# Patient Record
Sex: MALE | URBAN - METROPOLITAN AREA
[De-identification: names, ages, dates, MRNs, and addresses within clinical notes are randomized per-mention and may not be internally consistent; named-entity substitution may affect disease eponyms.]

---

## 2021-08-20 ENCOUNTER — WEB ENCOUNTER (OUTPATIENT)
Dept: URBAN - METROPOLITAN AREA CLINIC 80 | Facility: CLINIC | Age: 56
End: 2021-08-20

## 2023-06-22 ENCOUNTER — OFFICE VISIT (OUTPATIENT)
Dept: URBAN - METROPOLITAN AREA CLINIC 80 | Facility: CLINIC | Age: 58
End: 2023-06-22

## 2023-08-15 ENCOUNTER — OFFICE VISIT (OUTPATIENT)
Dept: URBAN - METROPOLITAN AREA CLINIC 80 | Facility: CLINIC | Age: 58
End: 2023-08-15

## 2023-10-16 ENCOUNTER — OFFICE VISIT (OUTPATIENT)
Dept: URBAN - METROPOLITAN AREA CLINIC 80 | Facility: CLINIC | Age: 58
End: 2023-10-16
Payer: COMMERCIAL

## 2023-10-16 ENCOUNTER — DASHBOARD ENCOUNTERS (OUTPATIENT)
Age: 58
End: 2023-10-16

## 2023-10-16 VITALS
WEIGHT: 166 LBS | HEART RATE: 65 BPM | BODY MASS INDEX: 27.66 KG/M2 | TEMPERATURE: 97.5 F | SYSTOLIC BLOOD PRESSURE: 154 MMHG | DIASTOLIC BLOOD PRESSURE: 97 MMHG | HEIGHT: 65 IN

## 2023-10-16 DIAGNOSIS — C61 PROSTATE CANCER: ICD-10-CM

## 2023-10-16 DIAGNOSIS — R13.10 ODYNOPHAGIA: ICD-10-CM

## 2023-10-16 DIAGNOSIS — Z80.0 FAMILY HISTORY OF COLON CANCER: ICD-10-CM

## 2023-10-16 DIAGNOSIS — Z86.010 HISTORY OF COLON POLYPS: ICD-10-CM

## 2023-10-16 DIAGNOSIS — K44.9 HH (HIATUS HERNIA): ICD-10-CM

## 2023-10-16 DIAGNOSIS — K21.9 GASTROESOPHAGEAL REFLUX DISEASE, UNSPECIFIED WHETHER ESOPHAGITIS PRESENT: ICD-10-CM

## 2023-10-16 DIAGNOSIS — K57.90 DIVERTICULOSIS: ICD-10-CM

## 2023-10-16 PROBLEM — 30233002: Status: ACTIVE | Noted: 2023-10-16

## 2023-10-16 PROBLEM — 235595009: Status: ACTIVE | Noted: 2023-10-16

## 2023-10-16 PROBLEM — 397881000: Status: ACTIVE | Noted: 2023-10-16

## 2023-10-16 PROBLEM — 399068003: Status: ACTIVE | Noted: 2023-10-16

## 2023-10-16 PROCEDURE — 99205 OFFICE O/P NEW HI 60 MIN: CPT | Performed by: INTERNAL MEDICINE

## 2023-10-16 RX ORDER — SOD SULF/POT CHLORIDE/MAG SULF 1.479 G
12 TABLETS THE FIRST DOSE THE EVENING BEFORE AND SECOND DOSE THE MORNING OF COLONOSCOPY TABLET ORAL TWICE A DAY
Qty: 24 | OUTPATIENT
Start: 2023-10-16 | End: 2023-10-17

## 2023-10-16 NOTE — HPI-TODAY'S VISIT:
pt is pleasant 59 yo male referred by Dr. Dano Broussard for colon screening  last seen in 2019.   had prior colonoscopy in 1/2018 for screening with one transverse colon polyp had maternal grandmother with colon cancer 5 yr f/u given seen in 2019 for worsening reflux he had an egd in 2019 with la class B esophagitis and HH.  negative path for hp/celiac he had a HH repair surgery after with improved results notes no issues with reflux now off ppi occ tightness with swallowing but no orion dysphagia and not interested in dilation notes normal stools no heamtochezia no abd pain stable weight good appetite.    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  not on blood thinners  no other complaints.

## 2023-10-16 NOTE — PHYSICAL EXAM EYES:
Conjuntivae and eyelids appear normal, Sclerae : White without injection New order placed at this time.

## 2023-11-16 ENCOUNTER — TELEPHONE ENCOUNTER (OUTPATIENT)
Dept: URBAN - METROPOLITAN AREA CLINIC 80 | Facility: CLINIC | Age: 58
End: 2023-11-16

## 2023-11-22 ENCOUNTER — CLAIMS CREATED FROM THE CLAIM WINDOW (OUTPATIENT)
Dept: URBAN - METROPOLITAN AREA SURGERY CENTER 19 | Facility: SURGERY CENTER | Age: 58
End: 2023-11-22
Payer: COMMERCIAL

## 2023-11-22 DIAGNOSIS — K63.5 BENIGN COLON POLYPS: ICD-10-CM

## 2023-11-22 DIAGNOSIS — D12.5 BENIGN NEOPLASM OF SIGMOID COLON: ICD-10-CM

## 2023-11-22 DIAGNOSIS — K29.80 DUODENITIS WITHOUT BLEEDING: ICD-10-CM

## 2023-11-22 DIAGNOSIS — Z86.010 PERSONAL HISTORY OF COLONIC POLYPS: ICD-10-CM

## 2023-11-22 DIAGNOSIS — R12 HEARTBURN: ICD-10-CM

## 2023-11-22 DIAGNOSIS — K31.89 OTHER DISEASES OF STOMACH AND DUODENUM: ICD-10-CM

## 2023-11-22 DIAGNOSIS — D12.3 BENIGN NEOPLASM OF TRANSVERSE COLON: ICD-10-CM

## 2023-11-22 PROCEDURE — 45385 COLONOSCOPY W/LESION REMOVAL: CPT | Performed by: INTERNAL MEDICINE

## 2023-11-22 PROCEDURE — 43239 EGD BIOPSY SINGLE/MULTIPLE: CPT | Performed by: INTERNAL MEDICINE

## 2023-11-22 PROCEDURE — G8907 PT DOC NO EVENTS ON DISCHARG: HCPCS | Performed by: INTERNAL MEDICINE

## 2023-11-22 PROCEDURE — 00813 ANES UPR LWR GI NDSC PX: CPT | Performed by: NURSE ANESTHETIST, CERTIFIED REGISTERED
